# Patient Record
Sex: FEMALE | Race: BLACK OR AFRICAN AMERICAN | Employment: OTHER | URBAN - METROPOLITAN AREA
[De-identification: names, ages, dates, MRNs, and addresses within clinical notes are randomized per-mention and may not be internally consistent; named-entity substitution may affect disease eponyms.]

---

## 2017-02-24 LAB
CREATININE, EXTERNAL: 1.1
HBA1C MFR BLD HPLC: NORMAL %
LDL-C, EXTERNAL: 83

## 2017-03-08 ENCOUNTER — DOCUMENTATION ONLY (OUTPATIENT)
Dept: ENDOCRINOLOGY | Age: 77
End: 2017-03-08

## 2017-03-08 LAB — TSH SERPL DL<=0.005 MIU/L-ACNC: 9 M[IU]/L

## 2017-03-08 NOTE — PROGRESS NOTES
I called Teresa Hampton and relayed Dr. Clark Lynn message. She has 10 mg tabs now so she will cut them in half. I also asked her to make a appointment to come in and she said she couldn't do that now because she had to find a ride first and then she will call to make the appointment.     Yahir Mahmood

## 2017-03-08 NOTE — PROGRESS NOTES
I received outside labs. I have recorded them in the chart as external labs    TSH is 9.0  With FT4 of 1.0 and TT3 of 0.9  Both on low side  Possibly developing hypothyrodism  Does not appear to by on thyroid  Hormone replacement    Patient not seen this year yet,   No upcoming appointment noted in chart. Please advise patient to schedule appointment for f/u of her thyroid function. Also Advise her to reduce her methimazole to 5mg tablets. If she has 10mg tabs, she may need to cut those in half and take half. She should first check to see what dose each tablet is. Thanks,  Johnny Parrish.  [facility]  [address] [place] [address]

## 2017-09-01 LAB
CREATININE, EXTERNAL: NORMAL
LDL-C, EXTERNAL: 98

## 2017-09-18 ENCOUNTER — DOCUMENTATION ONLY (OUTPATIENT)
Dept: ENDOCRINOLOGY | Age: 77
End: 2017-09-18

## 2017-11-16 ENCOUNTER — OFFICE VISIT (OUTPATIENT)
Dept: ENDOCRINOLOGY | Age: 77
End: 2017-11-16

## 2017-11-16 VITALS
HEIGHT: 62 IN | BODY MASS INDEX: 36.64 KG/M2 | SYSTOLIC BLOOD PRESSURE: 122 MMHG | DIASTOLIC BLOOD PRESSURE: 78 MMHG | HEART RATE: 71 BPM | WEIGHT: 199.1 LBS

## 2017-11-16 DIAGNOSIS — E05.90 HYPERTHYROIDISM: Primary | ICD-10-CM

## 2017-11-16 NOTE — PROGRESS NOTES
CHIEF COMPLAINT:  F/u for Hyperthyroidism    HISTORY OF PRESENT ILLNESS:   Yoanna Troy is a 68 y.o. female with a PMHx as noted below who was referred to our endocrinology clinic for evaluation of Hyperthyroidism. THYROID HISTORY:  Patient described that she had been visiting her PCP and she was concerned that she was loosing 3-4 pounds each time she would visit him. She is not certain how long it has been going on. Patient has no personal history of prior thyroid disorders. Notably family history for thyroid disorders is positive in her sister who reportedly had \"eyes popping out\" and had a thyroidectomy, another sister was on medications. Both had hyperthyroidism. she denied recent illness and denies any recent neck tenderness. she denied occasional palpitations, denies heat intolerance, and denied tremors. Blood pressure and heart rate were normal.    INTERVAL HISTORY:   Patient has not been seen in the endocrine clinic in the past year.    She had been taking the methimazole regularly,   We had received outside labs back in march, TSH was 9, FT4 1, TT3 0.9,   I had recommended by phone that she reduce her dose to 5mg once daily,   She had continued that dose but we had not heard from her since that phone call,  Today she reports feeling well, though has some significant hairloss on the top of her head,  Denies palpitations or tremors, has some arthritis,    PAST MEDICAL/SURGICAL HISTORY:   Past Medical History:   Diagnosis Date    Asthma     when she gets a cold    CAD (coronary artery disease)     cardiac stents X2 2006    Diabetes ([facility] [address]     High cholesterol     Hypertension     Nausea & vomiting      Past Surgical History:   Procedure Laterality Date    CARDIAC SURG PROCEDURE UNLIST  2006    stents x 2    COLONOSCOPY N/A 8/3/2016    COLONOSCOPY performed by Madge Cranker., MD at \[facility]\"" ENDOSCOPY    COLONOSCOPY,DIAGNOSTIC  8/3/2016         COLONOSCOPY,SAIRA FISH,SNARE  8/3/2016  COLORECTAL SCRN; HI RISK IND  8/3/2016         HX HEMORRHOIDECTOMY      HX HYSTERECTOMY      HX TONSILLECTOMY      TN COLONOSCOPY FLX DX W/COLLJ SPEC WHEN PFRMD  10/20/2010            ALLERGIES:   Allergies   Allergen Reactions    Hydrochlorothiazide Cough    Codeine Nausea and Vomiting    Penicillins Other (comments)     \"went away from self\"       MEDICATIONS ON ADMISSION:     Current Outpatient Prescriptions:     methIMAzole (TAPAZOLE) 5 mg tablet, Take 1 Tab by mouth daily. No need to break in half, Disp: 90 Tab, Rfl: 2    meloxicam (MOBIC) 15 mg tablet, daily. , Disp: , Rfl:     valsartan-hydrochlorothiazide (DIOVAN-HCT) 160-12.5 mg per tablet, Take 1 Tab by mouth daily. , Disp: , Rfl:     glimepiride (AMARYL) 4 mg tablet, Take  by mouth every morning., Disp: , Rfl:     Omeprazole delayed release (PRILOSEC D/R) 20 mg tablet, Take 20 mg by mouth daily. , Disp: , Rfl:     gabapentin (NEURONTIN) 300 mg capsule, Take 300 mg by mouth every evening., Disp: , Rfl:     simvastatin (ZOCOR) 40 mg tablet, Take 40 mg by mouth nightly., Disp: , Rfl:     aspirin delayed-release (ASPIR-81) 81 mg tablet, Take 81 mg by mouth daily. , Disp: , Rfl:     metformin (GLUCOPHAGE) 500 mg tablet, Take 500 mg by mouth two (2) times daily (with meals). , Disp: , Rfl:     OMEGA-3 FATTY ACIDS (FISH OIL CONCENTRATE PO), Take 1 Tab by mouth three (3) times daily. , Disp: , Rfl:     ACETAMINOPHEN (TYLENOL ARTHRITIS PO), Take 1 Tab by mouth daily. , Disp: , Rfl:     SOCIAL HISTORY:   Social History     Social History    Marital status: UNKNOWN     Spouse name: N/A    Number of children: N/A    Years of education: N/A     Occupational History    Not on file.      Social History Main Topics    Smoking status: Never Smoker    Smokeless tobacco: Never Used    Alcohol use No    Drug use: No    Sexual activity: Not on file     Other Topics Concern    Not on file     Social History Narrative       FAMILY HISTORY:  Family History   Problem Relation Age of Onset    Heart Disease Mother        REVIEW OF SYSTEMS: Complete ROS assessed and noted for that which is described above, all else are negative. Eyes: normal  ENT: normal  CVS: normal  Resp: normal  GI: normal  : normal  GYN: normal  Endocrine: normal  Integument: normal  Musculoskeletal: arthritis  Neuro: normal  Psych: normal      PHYSICAL EXAMINATION:    VITAL SIGNS:  Visit Vitals    /78 (BP 1 Location: Left arm, BP Patient Position: Sitting)    Pulse 71    Ht 5' 2\" (1.575 m)    Wt 199 lb 1.6 oz (90.3 kg)    BMI 36.42 kg/m2       GENERAL: NCAT, Sitting comfortably, NAD  EYES: EOMI, non-icteric, no proptosis  Ear/Nose/Throat: NCAT, no inflammation, thyroid gland is smooth and not enlarged, nodularity is not felt on palpation. LYMPH NODES: No LAD  CARDIOVASCULAR: S1 S2, RRR, No murmur, 2+ radial pulses  RESPIRATORY: CTA b/l, no wheeze/rales  GASTROINTESTINAL: soft, NT, ND  MUSCULOSKELETAL: Normal ROM, no atrophy  SKIN: warm, no edema/rash/ or other skin changes  NEUROLOGIC: 5/5 power all extremities, AAOx3, no tremor   PSYCHIATRIC: Normal affect, Normal insight and judgement      REVIEW OF LABORATORY AND RADIOLOGY DATA:   Labs and documentation have been reviewed as described above. ASSESSMENT AND PLAN:   Yosef Langley is a 68 y.o. female with a PMHx as noted above who was referred to our endocrinology clinic for evaluation of hyperthyroidism. Hyperthyroidism (likely due to graves disease)    Based on her history and the notion of her sister having protruding eyes, we have a strong suspicion for graves disease. Uptake and scan was not able to be performed in the past (attempted) as the facilities near her home did not perform it. She did well on methimazole, and the need for tapering is reassuring, however she needs closer follow up during dynamic dose changing since that is the only way to safely make medication decisions.      Continue methimazole 5mg daily  Check TSH/FT4/TT3 today  Plan to f/u in 2 months,  F/u labs in clinic as she live a good distance from here,    Patient is advised to contact our office if they have any concerns before then. Brittany Ramires.  [facility]  [address] [place] [address]

## 2017-11-16 NOTE — MR AVS SNAPSHOT
Visit Information Date & Time Provider Department Dept. Phone Encounter #  
 11/16/2017  2:30 PM Serenity Guerra, [address] [facility] [phone] [id number] Follow-up Instructions Return in about 2 months (around 1/16/2018). Upcoming Health Maintenance Date Due DTaP/Tdap/Td series (1 - Tdap) 10/17/1961 ZOSTER VACCINE AGE 60> 8/17/2000 GLAUCOMA SCREENING Q2Y 10/17/2005 OSTEOPOROSIS SCREENING (DEXA) 10/17/2005 Pneumococcal 65+ Low/Medium Risk (1 of 2 - PCV13) 10/17/2005 MEDICARE YEARLY EXAM 10/17/2005 Influenza Age 5 to Adult 8/1/2017 Allergies as of 11/16/2017  Review Complete On: 11/16/2017 By: Serenity Guerra MD  
  
 Severity Noted Reaction Type Reactions Hydrochlorothiazide Medium 08/02/2016   Side Effect Cough Codeine  10/19/2010    Nausea and Vomiting Penicillins  10/19/2010    Other (comments) \"went away from self\" Current Immunizations  Never Reviewed No immunizations on file. Not reviewed this visit You Were Diagnosed With   
  
 Codes Comments Hyperthyroidism    -  Primary ICD-10-CM: E05.90 ICD-9-CM: 242.90 Vitals BP Pulse Height(growth percentile) Weight(growth percentile) BMI OB Status 122/78 (BP 1 Location: Left arm, BP Patient Position: Sitting) 71 5' 2\" (1.575 m) 199 lb 1.6 oz (90.3 kg) 36.42 kg/m2 Postmenopausal  
 Smoking Status Never Smoker BMI and BSA Data Body Mass Index Body Surface Area  
 36.42 kg/m 2 1.99 m 2 Preferred Pharmacy Pharmacy Name Phone [facility], [address] [place] [address] [address] Fredi Rivera [address] Your Updated Medication List  
  
   
This list is accurate as of: 11/16/17  2:48 PM.  Always use your most recent med list.  
  
  
  
  
 ASPIR-81 81 mg tablet Generic drug:  aspirin delayed-release Take 81 mg by mouth daily.   
  
 FISH OIL CONCENTRATE PO  
 Take 1 Tab by mouth three (3) times daily. gabapentin 300 mg capsule Commonly known as:  NEURONTIN Take 300 mg by mouth every evening. glimepiride 4 mg tablet Commonly known as:  AMARYL Take  by mouth every morning. meloxicam 15 mg tablet Commonly known as:  MOBIC  
daily. metFORMIN 500 mg tablet Commonly known as:  GLUCOPHAGE Take 500 mg by mouth two (2) times daily (with meals). methIMAzole 5 mg tablet Commonly known as:  TAPAZOLE Take 1 Tab by mouth daily. No need to break in half Omeprazole delayed release 20 mg tablet Commonly known as:  PRILOSEC D/R Take 20 mg by mouth daily. simvastatin 40 mg tablet Commonly known as:  ZOCOR Take 40 mg by mouth nightly. TYLENOL ARTHRITIS PO Take 1 Tab by mouth daily. valsartan-hydroCHLOROthiazide 160-12.5 mg per tablet Commonly known as:  DIOVAN-HCT Take 1 Tab by mouth daily. We Performed the Following   
 T3 TOTAL [id number] CPT(R)] T4, FREE [id number] CPT(R)] TSH 3RD GENERATION [92937 CPT(R)] Follow-up Instructions Return in about 2 months (around 1/16/2018). Introducing [facility]! [facility] introduces [employer] patient portal. Now you can access parts of your medical record, email your doctor's office, and request medication refills online. 1. In your internet browser, go to [email or web address] 2. Click on the First Time User? Click Here link in the Sign In box. You will see the New Member Sign Up page. 3. Enter your [employer] Access Code exactly as it appears below. You will not need to use this code after youve completed the sign-up process. If you do not sign up before the expiration date, you must request a new code. · [employer] Access Code: [email or web address] Expires: 2/14/2018  2:29 PM 
 
4.  Enter the last four digits of your Social Security Number (xxxx) and Date of Birth (mm/dd/yyyy) as indicated and click Submit. You will be taken to the next sign-up page. 5. Create a ShoutOut ID. This will be your ShoutOut login ID and cannot be changed, so think of one that is secure and easy to remember. 6. Create a ShoutOut password. You can change your password at any time. 7. Enter your Password Reset Question and Answer. This can be used at a later time if you forget your password. 8. Enter your e-mail address. You will receive e-mail notification when new information is available in [address] 9. Click Sign Up. You can now view and download portions of your medical record. 10. Click the Download Summary menu link to download a portable copy of your medical information. If you have questions, please visit the Frequently Asked Questions section of the ShoutOut website. Remember, ShoutOut is NOT to be used for urgent needs. For medical emergencies, dial 911. Now available from your iPhone and Android! Please provide this summary of care documentation to your next provider. Your primary care clinician is listed as Mort Leaver. If you have any questions after today's visit, please call [phone].

## 2017-11-17 ENCOUNTER — TELEPHONE (OUTPATIENT)
Dept: ENDOCRINOLOGY | Age: 77
End: 2017-11-17

## 2017-11-17 LAB
T3 SERPL-MCNC: 93 NG/DL (ref 71–180)
T4 FREE SERPL-MCNC: 1.21 NG/DL (ref 0.82–1.77)
TSH SERPL DL<=0.005 MIU/L-ACNC: 3.68 UIU/ML (ref 0.45–4.5)

## 2017-11-17 NOTE — PROGRESS NOTES
Patients thyroid function is perfect for her at this time,  Continue 5 mg of methimazole each day,  She should let us know if she needs any refills in advance,    Shawna Rowan.  39 [facility]  [address] [place] [address]

## 2017-11-17 NOTE — TELEPHONE ENCOUNTER
----- Message from Jeannine Sands MD sent at 11/17/2017  9:04 AM EST -----  Patients thyroid function is perfect for her at this time,  Continue 5 mg of methimazole each day,  She should let us know if she needs any refills in advance,    April Lee. [facility]  [address] [place] [address] Tony    ===================================================    Addendum: 11/17/2017, 2:32 PM    Spoke with Juanito Bowman by phone, and relayed the above message. She understood the information and will do as instructed.        Albertina Holbrook

## 2018-03-26 ENCOUNTER — OFFICE VISIT (OUTPATIENT)
Dept: ENDOCRINOLOGY | Age: 78
End: 2018-03-26

## 2018-03-26 VITALS
HEART RATE: 70 BPM | BODY MASS INDEX: 36.97 KG/M2 | WEIGHT: 200.9 LBS | DIASTOLIC BLOOD PRESSURE: 75 MMHG | SYSTOLIC BLOOD PRESSURE: 119 MMHG | HEIGHT: 62 IN

## 2018-03-26 DIAGNOSIS — E05.90 HYPERTHYROIDISM: Primary | ICD-10-CM

## 2018-03-26 NOTE — PROGRESS NOTES
CHIEF COMPLAINT:  F/u for Hyperthyroidism    HISTORY OF PRESENT ILLNESS:   Alice Maravilla is a 68 y.o. female with a PMHx as noted below who was referred to our endocrinology clinic for evaluation of Hyperthyroidism. THYROID HISTORY:  Patient described that she had been visiting her PCP and she was concerned that she was loosing 3-4 pounds each time she would visit him. She is not certain how long it has been going on. Patient has no personal history of prior thyroid disorders. Notably family history for thyroid disorders is positive in her sister who reportedly had \"eyes popping out\" and had a thyroidectomy, another sister was on medications. Both had hyperthyroidism. she denied recent illness and denies any recent neck tenderness. she denied occasional palpitations, denies heat intolerance, and denied tremors. Blood pressure and heart rate were normal.    INTERVAL HISTORY:   Patient last seen in Nov 2017, prior to that had not seen her in 1 year.    She had been taking the methimazole regularly, 5mg once daily,  No recent labs for review,  Her HR and blood pressure are stable today,  Denies palpitations or tremors, has some arthritis,  Review of most recent thyroid function:  Lab Results   Component Value Date    TSH 3.680 11/16/2017    TSH 9.0 02/24/2017    TSH 1.290 11/03/2016    FT4 1.21 11/16/2017    FT4 1.24 11/03/2016    T3LT 93 11/16/2017    T3LT 113 11/03/2016    TSILT CANCELED 09/01/2016      Thyroid Lab Key:  TSILT = Thyroid stimulating antibodies  TMCLT = TPO antibodies  T3LT = Total T3 levels  555413 = Direct FT4  565352 = Free T3      PAST MEDICAL/SURGICAL HISTORY:   Past Medical History:   Diagnosis Date    Asthma     when she gets a cold    CAD (coronary artery disease)     cardiac stents X2 2006    Diabetes ([address]     High cholesterol     Hypertension     Nausea & vomiting      Past Surgical History:   Procedure Laterality Date    CARDIAC SURG PROCEDURE UNLIST  2006    stents x 2    COLONOSCOPY N/A 8/3/2016    COLONOSCOPY performed by Marilynne Ganser., MD at hospitals ENDOSCOPY    COLONOSCOPY,DIAGNOSTIC  8/3/2016         COLONOSCOPY,REMV LESN,SNARE  8/3/2016         COLORECTAL SCRN; HI RISK IND  8/3/2016         HX HEMORRHOIDECTOMY      HX HYSTERECTOMY      HX TONSILLECTOMY      MS COLONOSCOPY FLX DX W/COLLJ SPEC WHEN PFRMD  10/20/2010            ALLERGIES:   Allergies   Allergen Reactions    Hydrochlorothiazide Cough    Codeine Nausea and Vomiting    Penicillins Other (comments)     \"went away from self\"       MEDICATIONS ON ADMISSION:     Current Outpatient Prescriptions:     methIMAzole (TAPAZOLE) 5 mg tablet, Take 1 Tab by mouth daily. No need to break in half, Disp: 90 Tab, Rfl: 2    meloxicam (MOBIC) 15 mg tablet, daily. , Disp: , Rfl:     valsartan-hydrochlorothiazide (DIOVAN-HCT) 160-12.5 mg per tablet, Take 1 Tab by mouth daily. , Disp: , Rfl:     glimepiride (AMARYL) 4 mg tablet, Take  by mouth every morning., Disp: , Rfl:     Omeprazole delayed release (PRILOSEC D/R) 20 mg tablet, Take 20 mg by mouth daily. , Disp: , Rfl:     gabapentin (NEURONTIN) 300 mg capsule, Take 300 mg by mouth every evening., Disp: , Rfl:     simvastatin (ZOCOR) 40 mg tablet, Take 40 mg by mouth nightly., Disp: , Rfl:     aspirin delayed-release (ASPIR-81) 81 mg tablet, Take 81 mg by mouth daily. , Disp: , Rfl:     metformin (GLUCOPHAGE) 500 mg tablet, Take 500 mg by mouth two (2) times daily (with meals). , Disp: , Rfl:     ACETAMINOPHEN (TYLENOL ARTHRITIS PO), Take 1 Tab by mouth daily. , Disp: , Rfl:     OMEGA-3 FATTY ACIDS (FISH OIL CONCENTRATE PO), Take 1 Tab by mouth three (3) times daily. , Disp: , Rfl:     SOCIAL HISTORY:   Social History     Social History    Marital status: UNKNOWN     Spouse name: N/A    Number of children: N/A    Years of education: N/A     Occupational History    Not on file.      Social History Main Topics    Smoking status: Never Smoker    Smokeless tobacco: Never Used    Alcohol use No    Drug use: No    Sexual activity: Not on file     Other Topics Concern    Not on file     Social History Narrative       FAMILY HISTORY:  Family History   Problem Relation Age of Onset    Heart Disease Mother        REVIEW OF SYSTEMS: Complete ROS assessed and noted for that which is described above, all else are negative. Eyes: normal  ENT: normal  CVS: normal  Resp: normal  GI: normal  : normal  GYN: normal  Endocrine: normal  Integument: normal  Musculoskeletal: arthritis  Neuro: normal  Psych: normal      PHYSICAL EXAMINATION:    VITAL SIGNS:  Visit Vitals    /75    Pulse 70    Ht 5' 2\" (1.575 m)    Wt 200 lb 14.4 oz (91.1 kg)    BMI 36.75 kg/m2       GENERAL: NCAT, Sitting comfortably, NAD  EYES: EOMI, non-icteric, no proptosis  Ear/Nose/Throat: NCAT, no inflammation, thyroid gland is smooth and not enlarged, nodularity is not felt on palpation. LYMPH NODES: No LAD  CARDIOVASCULAR: S1 S2, RRR, No murmur, 2+ radial pulses  RESPIRATORY: CTA b/l, no wheeze/rales  GASTROINTESTINAL: soft, NT, ND  MUSCULOSKELETAL: Normal ROM, no atrophy  SKIN: warm, no edema/rash/ or other skin changes  NEUROLOGIC: 5/5 power all extremities, AAOx3, no tremor   PSYCHIATRIC: Normal affect, Normal insight and judgement      REVIEW OF LABORATORY AND RADIOLOGY DATA:   Labs and documentation have been reviewed as described above. ASSESSMENT AND PLAN:   Viraj Diaz is a 68 y.o. female with a PMHx as noted above who was referred to our endocrinology clinic for evaluation of hyperthyroidism. Hyperthyroidism (likely due to graves disease)    Patients course has been following a predictable pattern and we can hopefully plan to continue tapering her dose in the near future. The goal is to have the best chance a long term remission from her graves disease.      Continue methimazole 5mg daily  Check TSH/FT4/TT3 today, will call with result and plan,  Plan to f/u in 3 months,  F/u labs in clinic as she live a good distance from here,    Patient is advised to contact our office if they have any concerns before then. Meli Jarrett.  39 [facility]  [facility]

## 2018-03-26 NOTE — PATIENT INSTRUCTIONS
Continue with 5mg methimazole once daily,   Blood levels today, will call with results and plan,   Will see you back in 3 months,     Sendy BECKHAM  39 [facility]  [address] [place] [address]

## 2018-03-26 NOTE — MR AVS SNAPSHOT
[address] [facility] [address] [address] [phone] Patient: Geoff Us MRN: [id number] YIK:[id number] Visit Information Date & Time Provider Department Dept. Phone Encounter #  
 3/26/2018 12:10 PM Cha Warren, [address] [place] [address] Diabetes and Endocrinology [id number] Follow-up Instructions Return in about 3 months (around 6/26/2018). Upcoming Health Maintenance Date Due DTaP/Tdap/Td series (1 - Tdap) 10/17/1961 ZOSTER VACCINE AGE 60> 8/17/2000 GLAUCOMA SCREENING Q2Y 10/17/2005 Bone Densitometry (Dexa) Screening 10/17/2005 Pneumococcal 65+ Low/Medium Risk (1 of 2 - PCV13) 10/17/2005 Influenza Age 5 to Adult 8/1/2017 MEDICARE YEARLY EXAM 3/14/2018 Allergies as of 3/26/2018  Review Complete On: 3/26/2018 By: Cha Warren MD  
  
 Severity Noted Reaction Type Reactions Hydrochlorothiazide Medium 08/02/2016   Side Effect Cough Codeine  10/19/2010    Nausea and Vomiting Penicillins  10/19/2010    Other (comments) \"went away from self\" Current Immunizations  Never Reviewed No immunizations on file. Not reviewed this visit You Were Diagnosed With   
  
 Codes Comments Hyperthyroidism    -  Primary ICD-10-CM: E05.90 ICD-9-CM: 242.90 Vitals BP Pulse Height(growth percentile) Weight(growth percentile) BMI OB Status 119/75 70 5' 2\" (1.575 m) 200 lb 14.4 oz (91.1 kg) 36.75 kg/m2 Postmenopausal  
 Smoking Status Never Smoker Vitals History BMI and BSA Data Body Mass Index Body Surface Area 36.75 kg/m 2 2 m 2 Preferred Pharmacy Pharmacy Name Phone 305 [facility], [address] [address] [place] [address] [address] Your Updated Medication List  
  
   
This list is accurate as of 3/26/18  1:00 PM.  Always use your most recent med list.  
  
  
  
  
 ASPIR-81 81 mg tablet Generic drug:  aspirin delayed-release Take 81 mg by mouth daily. FISH OIL CONCENTRATE PO Take 1 Tab by mouth three (3) times daily. gabapentin 300 mg capsule Commonly known as:  NEURONTIN Take 300 mg by mouth every evening. glimepiride 4 mg tablet Commonly known as:  AMARYL Take  by mouth every morning. meloxicam 15 mg tablet Commonly known as:  MOBIC  
daily. metFORMIN 500 mg tablet Commonly known as:  GLUCOPHAGE Take 500 mg by mouth two (2) times daily (with meals). methIMAzole 5 mg tablet Commonly known as:  TAPAZOLE Take 1 Tab by mouth daily. No need to break in half Omeprazole delayed release 20 mg tablet Commonly known as:  PRILOSEC D/R Take 20 mg by mouth daily. simvastatin 40 mg tablet Commonly known as:  ZOCOR Take 40 mg by mouth nightly. TYLENOL ARTHRITIS PO Take 1 Tab by mouth daily. valsartan-hydroCHLOROthiazide 160-12.5 mg per tablet Commonly known as:  DIOVAN-HCT Take 1 Tab by mouth daily. We Performed the Following   
 T3 TOTAL [id number] CPT(R)] T4, FREE [id number] CPT(R)] TSH 3RD GENERATION [85368 CPT(R)] Follow-up Instructions Return in about 3 months (around 6/26/2018). Patient Instructions Continue with 5mg methimazole once daily, Blood levels today, will call with results and plan, Will see you back in 3 months,  
 
Jazmine BECKHAM 0420 [facility] Diabetes & Endocrinology [address] [place] [address] & HEALTH SERVICES! [facility] introduces "[employer] patient portal. Now you can access parts of your medical record, email your doctor's office, and request medication refills online. 1. In your internet browser, go to [email or web address] 2. Click on the First Time User? Click Here link in the Sign In box. You will see the New Member Sign Up page. 3. Enter your "[employer] Access Code exactly as it appears below.  You will not need to use this code after youve completed the sign-up process. If you do not sign up before the expiration date, you must request a new code. · [employer] Access Code: [email or web address] Expires: 6/24/2018 12:59 PM 
 
4. Enter the last four digits of your Social Security Number (xxxx) and Date of Birth (mm/dd/yyyy) as indicated and click Submit. You will be taken to the next sign-up page. 5. Create a [employer] ID. This will be your [employer] login ID and cannot be changed, so think of one that is secure and easy to remember. 6. Create a [employer] password. You can change your password at any time. 7. Enter your Password Reset Question and Answer. This can be used at a later time if you forget your password. 8. Enter your e-mail address. You will receive e-mail notification when new information is available in [address] 9. Click Sign Up. You can now view and download portions of your medical record. 10. Click the Download Summary menu link to download a portable copy of your medical information. If you have questions, please visit the Frequently Asked Questions section of the [employer] website. Remember, [employer] is NOT to be used for urgent needs. For medical emergencies, dial 911. Now available from your iPhone and Android! Please provide this summary of care documentation to your next provider. Your primary care clinician is listed as Annamaria Amaya. If you have any questions after today's visit, please call [phone].

## 2018-03-27 LAB
T3 SERPL-MCNC: 107 NG/DL (ref 71–180)
T4 FREE SERPL-MCNC: 1.28 NG/DL (ref 0.82–1.77)
TSH SERPL DL<=0.005 MIU/L-ACNC: 3.36 UIU/ML (ref 0.45–4.5)

## 2018-03-27 NOTE — PROGRESS NOTES
Doing great on 5mg methimazole once daily,  No changes, continue, will see her back in 3 months,  Lives far from here, doesn't do prelabs, will check in clinic,    Thanks,  Albin Ann.  39 [facility] Endocrinology  [address] [place] [address]

## 2018-03-28 ENCOUNTER — TELEPHONE (OUTPATIENT)
Dept: ENDOCRINOLOGY | Age: 78
End: 2018-03-28

## 2018-03-28 NOTE — TELEPHONE ENCOUNTER
I called Ms. Arisderic Albert and relayed the message from Dr. Vignesh Sandhu. She understood the information and will do as instructed.   Sabine Kumar

## 2018-03-28 NOTE — TELEPHONE ENCOUNTER
----- Message from Mannie Mendiola MD sent at 3/27/2018 10:31 AM EDT -----  Doing great on 5mg methimazole once daily,  No changes, continue, will see her back in 3 months,  Lives far from here, doesn't do prelabs, will check in clinic,    Thanks,  Ilda Barros.  39 [facility] Endocrinology  [address] [place] [address]

## 2018-05-23 RX ORDER — METHIMAZOLE 5 MG/1
TABLET ORAL
Qty: 90 TAB | Refills: 3 | Status: SHIPPED | OUTPATIENT
Start: 2018-05-23

## 2018-06-27 ENCOUNTER — OFFICE VISIT (OUTPATIENT)
Dept: ENDOCRINOLOGY | Age: 78
End: 2018-06-27

## 2018-06-27 VITALS
WEIGHT: 203.6 LBS | HEART RATE: 62 BPM | BODY MASS INDEX: 37.47 KG/M2 | SYSTOLIC BLOOD PRESSURE: 145 MMHG | DIASTOLIC BLOOD PRESSURE: 84 MMHG | HEIGHT: 62 IN

## 2018-06-27 DIAGNOSIS — E05.90 HYPERTHYROIDISM: Primary | ICD-10-CM

## 2018-06-27 NOTE — PATIENT INSTRUCTIONS
Continue 5mg methimazole once daily,   We will call you with your results and any changes to your medicine,     See you back in 3 months,     Do BECKHAM  39 [facility] Endocrinology  [address] [place] [address]

## 2018-06-27 NOTE — PROGRESS NOTES
CHIEF COMPLAINT:  F/u for Hyperthyroidism    HISTORY OF PRESENT ILLNESS:   Veto Corbett is a 68 y.o. female with a PMHx as noted below who was referred to our endocrinology clinic for evaluation of Hyperthyroidism. THYROID HISTORY:  Patient described that she had been visiting her PCP and she was concerned that she was loosing 3-4 pounds each time she would visit him. She is not certain how long it has been going on. Patient has no personal history of prior thyroid disorders. Notably family history for thyroid disorders is positive in her sister who reportedly had \"eyes popping out\" and had a thyroidectomy, another sister was on medications. Both had hyperthyroidism. she denied recent illness and denies any recent neck tenderness. she denied occasional palpitations, denies heat intolerance, and denied tremors. Blood pressure and heart rate were normal.    INTERVAL HISTORY:   Continues on 5mg methimazole once daily,  Tolerating her medications well,   Her HR is stable.    No large goiter or symptoms of dypsphagia  No palpitations or other symptoms of hyperthyroidism,  Review of most recent thyroid function:  Lab Results   Component Value Date    TSH 3.360 03/26/2018    TSH 3.680 11/16/2017    TSH 9.0 02/24/2017    FT4 1.28 03/26/2018    FT4 1.21 11/16/2017    FT4 1.24 11/03/2016    T3LT 107 03/26/2018    T3LT 93 11/16/2017    T3LT 113 11/03/2016    TSILT CANCELED 09/01/2016      Thyroid Lab Key:  TSILT = Thyroid stimulating antibodies  TMCLT = TPO antibodies  T3LT = Total T3 levels  517091 = Direct FT4  [id number] = Free T3      PAST MEDICAL/SURGICAL HISTORY:   Past Medical History:   Diagnosis Date    Asthma     when she gets a cold    CAD (coronary artery disease)     cardiac stents X2 2006    Diabetes ([address]     High cholesterol     Hypertension     Nausea & vomiting      Past Surgical History:   Procedure Laterality Date    CARDIAC SURG PROCEDURE UNLIST  2006    stents x 2    COLONOSCOPY N/A 8/3/2016 COL[facility]COPY performed [facility] Von [facility] ENDOSCOPY    COLONOSCOPY,DIAGNOSTIC  8/3/2016         COLONOSCOPY,REMV LESN,SNARE  8/3/2016         COLORECTAL SCRN; HI RISK IND  8/3/2016         HX HEMORRHOIDECTOMY      HX HYSTERECTOMY      HX TONSILLECTOMY      UT COLONOSCOPY FLX DX W/COLLJ SPEC WHEN PFRMD  10/20/2010            ALLERGIES:   Allergies   Allergen Reactions    Hydrochlorothiazide Cough    Codeine Nausea and Vomiting    Penicillins Other (comments)     \"went away from self\"       MEDICATIONS ON ADMISSION:     Current Outpatient Prescriptions:     methIMAzole (TAPAZOLE) 5 mg tablet, TAKE 1 TABLET BY MOUTH  DAILY, Disp: 90 Tab, Rfl: 3    meloxicam (MOBIC) 15 mg tablet, daily. , Disp: , Rfl:     valsartan-hydrochlorothiazide (DIOVAN-HCT) 160-12.5 mg per tablet, Take 1 Tab by mouth daily. , Disp: , Rfl:     glimepiride (AMARYL) 4 mg tablet, Take  by mouth every morning., Disp: , Rfl:     Omeprazole delayed release (PRILOSEC D/R) 20 mg tablet, Take 20 mg by mouth daily. , Disp: , Rfl:     gabapentin (NEURONTIN) 300 mg capsule, Take 300 mg by mouth every evening., Disp: , Rfl:     simvastatin (ZOCOR) 40 mg tablet, Take 40 mg by mouth nightly., Disp: , Rfl:     aspirin delayed-release (ASPIR-81) 81 mg tablet, Take 81 mg by mouth daily. , Disp: , Rfl:     metformin (GLUCOPHAGE) 500 mg tablet, Take 500 mg by mouth two (2) times daily (with meals). , Disp: , Rfl:     OMEGA-3 FATTY ACIDS (FISH OIL CONCENTRATE PO), Take 1 Tab by mouth three (3) times daily. , Disp: , Rfl:     ACETAMINOPHEN (TYLENOL ARTHRITIS PO), Take 1 Tab by mouth daily. , Disp: , Rfl:     SOCIAL HISTORY:   Social History     Social History    Marital status: UNKNOWN     Spouse name: N/A    Number of children: N/A    Years of education: N/A     Occupational History    Not on file.      Social History Main Topics    Smoking status: Never Smoker    Smokeless tobacco: Never Used    Alcohol use No    Drug use: No    Sexual activity: Not on file     Other Topics Concern    Not on file     Social History Narrative       FAMILY HISTORY:  Family History   Problem Relation Age of Onset    Heart Disease Mother        REVIEW OF SYSTEMS: Complete ROS assessed and noted for that which is described above, all else are negative. Eyes: normal  ENT: normal  CVS: normal  Resp: normal  GI: normal  : normal  GYN: normal  Endocrine: normal  Integument: normal  Musculoskeletal: arthritis  Neuro: normal  Psych: normal      PHYSICAL EXAMINATION:    VITAL SIGNS:  Visit Vitals    /84 (BP 1 Location: Left arm, BP Patient Position: Sitting)    Pulse 62    Ht 5' 2\" (1.575 m)    Wt 203 lb 9.6 oz (92.4 kg)    BMI 37.24 kg/m2       GENERAL: NCAT, Sitting comfortably, NAD  EYES: EOMI, non-icteric, no proptosis  Ear/Nose/Throat: NCAT, no inflammation, thyroid gland is smooth and not enlarged, nodularity is not felt on palpation. LYMPH NODES: No LAD  CARDIOVASCULAR: S1 S2, RRR, No murmur, 2+ radial pulses  RESPIRATORY: CTA b/l, no wheeze/rales  GASTROINTESTINAL: soft, NT, ND  MUSCULOSKELETAL: Normal ROM, no atrophy  SKIN: warm, no edema/rash/ or other skin changes  NEUROLOGIC: 5/5 power all extremities, AAOx3, no tremor   PSYCHIATRIC: Normal affect, Normal insight and judgement      REVIEW OF LABORATORY AND RADIOLOGY DATA:   Labs and documentation have been reviewed as described above. ASSESSMENT AND PLAN:   Sean Marques is a 68 y.o. female with a PMHx as noted above who was referred to our endocrinology clinic for evaluation of hyperthyroidism. Hyperthyroidism (likely due to graves disease)    Patient is feeling well and tolerating her medications. We will need to assess her thyroid levels today.      Continue methimazole 5mg daily  Check TSH/FT4/TT3 today, will call with result and plan,  Plan to f/u in 3 months,  F/u labs in clinic as she live a good distance from here,    Patient is advised to contact our office if they have any concerns before then. Drenda Habermann.  39 [facility] Endocrinology   [facility]

## 2018-06-27 NOTE — MR AVS SNAPSHOT
[address] [place] [address] [address] [facility] [address] [address] [phone] Patient: Shaan lGynn MRN: [id number]:[id number]/[id number] Visit Information Date & Time Provider Department Dept. Phone Encounter #  
 6/27/2018 10:50 AM Gi Dudley, [address] [place] [address] Diabetes and Endocrinology [phone] Follow-up Instructions Return in about 3 months (around 9/27/2018). Upcoming Health Maintenance Date Due DTaP/Tdap/Td series (1 - Tdap) 10/17/1961 ZOSTER VACCINE AGE 60> 8/17/2000 GLAUCOMA SCREENING Q2Y 10/17/2005 Bone Densitometry (Dexa) Screening 10/17/2005 Pneumococcal 65+ Low/Medium Risk (1 of 2 - PCV13) 10/17/2005 MEDICARE YEARLY EXAM 3/14/2018 Influenza Age 5 to Adult 8/1/2018 Allergies as of 6/27/2018  Review Complete On: 6/27/2018 By: Gi Dudley MD  
  
 Severity Noted Reaction Type Reactions Hydrochlorothiazide Medium 08/02/2016   Side Effect Cough Codeine  10/19/2010    Nausea and Vomiting Penicillins  10/19/2010    Other (comments) \"went away from self\" Current Immunizations  Never Reviewed No immunizations on file. Not reviewed this visit You Were Diagnosed With   
  
 Codes Comments Hyperthyroidism    -  Primary ICD-10-CM: E05.90 ICD-9-CM: 242.90 Vitals BP Pulse Height(growth percentile) Weight(growth percentile) BMI OB Status 145/84 (BP 1 Location: Left arm, BP Patient Position: Sitting) 62 5' 2\" (1.575 m) 203 lb 9.6 oz (92.4 kg) 37.24 kg/m2 Postmenopausal  
 Smoking Status Never Smoker BMI and BSA Data Body Mass Index Body Surface Area  
 37.24 kg/m 2 2.01 m 2 Preferred Pharmacy Pharmacy Name Phone 305 [facility], [address] [address] [place] [address] [address] Your Updated Medication List  
  
   
This list is accurate as of 6/27/18 11:10 AM.  Always use your most recent med list.  
  
  
  
  
 ASPIR-81 81 mg tablet Generic drug:  aspirin delayed-release Take 81 mg by mouth daily. FISH OIL CONCENTRATE PO Take 1 Tab by mouth three (3) times daily. gabapentin 300 mg capsule Commonly known as:  NEURONTIN Take 300 mg by mouth every evening. glimepiride 4 mg tablet Commonly known as:  AMARYL Take  by mouth every morning. meloxicam 15 mg tablet Commonly known as:  MOBIC  
daily. metFORMIN 500 mg tablet Commonly known as:  GLUCOPHAGE Take 500 mg by mouth two (2) times daily (with meals). methIMAzole 5 mg tablet Commonly known as:  TAPAZOLE  
TAKE 1 TABLET BY MOUTH  DAILY Omeprazole delayed release 20 mg tablet Commonly known as:  PRILOSEC D/R Take 20 mg by mouth daily. simvastatin 40 mg tablet Commonly known as:  ZOCOR Take 40 mg by mouth nightly. TYLENOL ARTHRITIS PO Take 1 Tab by mouth daily. valsartan-hydroCHLOROthiazide 160-12.5 mg per tablet Commonly known as:  DIOVAN-HCT Take 1 Tab by mouth daily. We Performed the Following T4, FREE [id number] CPT(R)] TSH 3RD GENERATION [03424 CPT(R)] Follow-up Instructions Return in about 3 months (around 9/27/2018). Patient Instructions Continue 5mg methimazole once daily, We will call you with your results and any changes to your medicine, See you back in 3 months,  
 
Kvng VILLANUEVA. 4140 [facility] Diabetes & Endocrinology [address] [place] [address] & HEALTH SERVICES! [facility] introduces [employer] patient portal. Now you can access parts of your medical record, email your doctor's office, and request medication refills online. 1. In your internet browser, go to [email or web address] 2. Click on the First Time User? Click Here link in the Sign In box. You will see the New Member Sign Up page. 3. Enter your [employer] Access Code exactly as it appears below.  You will not need to use this code after youve completed the sign-up process. If you do not sign up before the expiration date, you must request a new code. · [employer] Access Code: [email or web address] Expires: 9/25/2018 11:09 AM 
 
4. Enter the last four digits of your Social Security Number (xxxx) and Date of Birth (mm/dd/yyyy) as indicated and click Submit. You will be taken to the next sign-up page. 5. Create a [employer] ID. This will be your [employer] login ID and cannot be changed, so think of one that is secure and easy to remember. 6. Create a [employer] password. You can change your password at any time. 7. Enter your Password Reset Question and Answer. This can be used at a later time if you forget your password. 8. Enter your e-mail address. You will receive e-mail notification when new information is available in [address] 9. Click Sign Up. You can now view and download portions of your medical record. 10. Click the Download Summary menu link to download a portable copy of your medical information. If you have questions, please visit the Frequently Asked Questions section of the [employer] website. Remember, [employer] is NOT to be used for urgent needs. For medical emergencies, dial 911. Now available from your iPhone and Android! Please provide this summary of care documentation to your next provider. Your primary care clinician is listed as Oscar Pearson. If you have any questions after today's visit, please call [phone].

## 2018-06-28 ENCOUNTER — TELEPHONE (OUTPATIENT)
Dept: ENDOCRINOLOGY | Age: 78
End: 2018-06-28

## 2018-06-28 LAB
T4 FREE SERPL-MCNC: 1.37 NG/DL (ref 0.82–1.77)
TSH SERPL DL<=0.005 MIU/L-ACNC: 3.59 UIU/ML (ref 0.45–4.5)

## 2018-06-28 NOTE — PROGRESS NOTES
Thyroid function is stable,   Continue 5mg methimazole each morning,     Vance Fan T.  39 [facility] Endocrinology  [address] [place] [address]

## 2018-06-28 NOTE — TELEPHONE ENCOUNTER
I attempted to call Ms. Aleks Khalil. I reached her voice mail. I let her a message relaying the message from Dr. Alex Jack and said to give me a call back with any questions.   Doreen Vaughn

## 2018-06-28 NOTE — TELEPHONE ENCOUNTER
----- Message from Florentin Barber MD sent at 6/28/2018  6:14 AM EDT -----  Thyroid function is stable,   Continue 5mg methimazole each morning,     Vance BECKHAM  39 [facility] Endocrinology  [address] [place] [address]

## 2018-12-05 ENCOUNTER — OFFICE VISIT (OUTPATIENT)
Dept: ENDOCRINOLOGY | Age: 78
End: 2018-12-05

## 2018-12-05 VITALS
DIASTOLIC BLOOD PRESSURE: 74 MMHG | HEART RATE: 85 BPM | BODY MASS INDEX: 37.6 KG/M2 | WEIGHT: 204.3 LBS | HEIGHT: 62 IN | SYSTOLIC BLOOD PRESSURE: 116 MMHG

## 2018-12-05 DIAGNOSIS — E05.90 HYPERTHYROIDISM: Primary | ICD-10-CM

## 2018-12-05 RX ORDER — IRBESARTAN AND HYDROCHLOROTHIAZIDE 150; 12.5 MG/1; MG/1
TABLET, FILM COATED ORAL
COMMUNITY
Start: 2018-10-14

## 2018-12-05 NOTE — PATIENT INSTRUCTIONS
Continue 5mg methimazole once daily,   We will call you with your results and any changes to your medicine,     See you back in 4 months,     Chayo VILLANUEVA.  [address] [place] [address] Endocrinology  [employer]

## 2018-12-05 NOTE — PROGRESS NOTES
CHIEF COMPLAINT:  F/u for Hyperthyroidism    HISTORY OF PRESENT ILLNESS:   Abraham Crocker is a 66 y.o. female with a PMHx as noted below who was referred to our endocrinology clinic for evaluation of Hyperthyroidism. THYROID HISTORY:  Patient described that she had been visiting her PCP and she was concerned that she was loosing 3-4 pounds each time she would visit him. She is not certain how long it has been going on. Patient has no personal history of prior thyroid disorders. Notably family history for thyroid disorders is positive in her sister who reportedly had \"eyes popping out\" and had a thyroidectomy, another sister was on medications. Both had hyperthyroidism. she denied recent illness and denies any recent neck tenderness. she denied occasional palpitations, denies heat intolerance, and denied tremors.    Blood pressure and heart rate were normal.    INTERVAL HISTORY:   Patient continues on 5mg methimazole once daily,  She has historically tolerated the medicine well,  Vitals reviewed and are stable,  No palpitations or other symptoms of hyperthyroidism,  No recent labs as she is unable to complete prelabs, will check today  Review of most recent thyroid function:  Lab Results   Component Value Date    TSH 3.590 06/27/2018    TSH 3.360 03/26/2018    TSH 3.680 11/16/2017    FT4 1.37 06/27/2018    FT4 1.28 03/26/2018    FT4 1.21 11/16/2017    T3LT 107 03/26/2018    T3LT 93 11/16/2017    T3LT 113 11/03/2016    TSILT CANCELED 09/01/2016      Thyroid Lab Key:  TSILT = Thyroid stimulating antibodies  TMCLT = TPO antibodies  T3LT = Total T3 levels  [id number] = Direct FT4  [id number] = Free T3      PAST MEDICAL/SURGICAL HISTORY:   Past Medical History:   Diagnosis Date    Asthma     when she gets a cold    CAD (coronary artery disease)     cardiac stents X2 2006    Diabetes ([address]     High cholesterol     Hypertension     Nausea & vomiting      Past Surgical History:   Procedure Laterality Date    CARDIAC SURG PROCEDURE UNLIST  2006    stents x 2    COLONOSCOPY N/A 8/3/2016    [facility] at [facility]OSCOPY    COLONOSCOPY,DIAGNOSTIC  8/3/2016         COLONOSCOPY,REMV LESN,SNARE  8/3/2016         COLORECTAL SCRN; HI RISK IND  8/3/2016         HX HEMORRHOIDECTOMY      HX HYSTERECTOMY      HX TONSILLECTOMY      DE COLONOSCOPY FLX DX W/COLLJ SPEC WHEN PFRMD  10/20/2010            ALLERGIES:   Allergies   Allergen Reactions    Hydrochlorothiazide Cough    Codeine Nausea and Vomiting    Penicillins Other (comments)     \"went away from self\"       MEDICATIONS ON ADMISSION:     Current Outpatient Medications:     irbesartan-hydroCHLOROthiazide (AVALIDE) 150-12.5 mg per tablet, , Disp: , Rfl:     methIMAzole (TAPAZOLE) 5 mg tablet, TAKE 1 TABLET BY MOUTH  DAILY, Disp: 90 Tab, Rfl: 3    meloxicam (MOBIC) 15 mg tablet, daily. , Disp: , Rfl:     glimepiride (AMARYL) 4 mg tablet, Take  by mouth every morning., Disp: , Rfl:     Omeprazole delayed release (PRILOSEC D/R) 20 mg tablet, Take 20 mg by mouth daily. , Disp: , Rfl:     gabapentin (NEURONTIN) 300 mg capsule, Take 300 mg by mouth every evening., Disp: , Rfl:     simvastatin (ZOCOR) 40 mg tablet, Take 40 mg by mouth nightly., Disp: , Rfl:     aspirin delayed-release (ASPIR-81) 81 mg tablet, Take 81 mg by mouth daily. , Disp: , Rfl:     metformin (GLUCOPHAGE) 500 mg tablet, Take 500 mg by mouth two (2) times daily (with meals). , Disp: , Rfl:     OMEGA-3 FATTY ACIDS (FISH OIL CONCENTRATE PO), Take 1 Tab by mouth three (3) times daily. , Disp: , Rfl:     valsartan-hydrochlorothiazide (DIOVAN-HCT) 160-12.5 mg per tablet, Take 1 Tab by mouth daily. , Disp: , Rfl:     ACETAMINOPHEN (TYLENOL ARTHRITIS PO), Take 1 Tab by mouth daily. , Disp: , Rfl:     SOCIAL HISTORY:   Social History     Socioeconomic History    Marital status: UNKNOWN     Spouse name: Not on file    Number of children: Not on file    Years of education: Not on file    Highest education level: Not on file   Social Needs    Financial resource strain: Not on file    Food insecurity - worry: Not on file    Food insecurity - inability: Not on file    Transportation needs - medical: Not on file   [place] needs - non-medical: Not on file   Occupational History    Not on file   Tobacco Use    Smoking status: Never Smoker    Smokeless tobacco: Never Used   Substance and Sexual Activity    Alcohol use: No    Drug use: No    Sexual activity: Not on file   Other Topics Concern    Not on file   Social History Narrative    Not on file       FAMILY HISTORY:  Family History   Problem Relation Age of Onset    Heart Disease Mother        REVIEW OF SYSTEMS: Complete ROS assessed and noted for that which is described above, all else are negative. Eyes: normal  ENT: normal  CVS: normal  Resp: normal  GI: normal  : normal  GYN: normal  Endocrine: normal  Integument: normal  Musculoskeletal: arthritis  Neuro: normal  Psych: normal      PHYSICAL EXAMINATION:    VITAL SIGNS:  Visit Vitals  /74 (BP 1 Location: Left arm, BP Patient Position: Sitting)   Pulse 85   Ht 5' 2\" (1.575 m)   Wt 204 lb 4.8 oz (92.7 kg)   BMI 37.37 kg/m²       GENERAL: NCAT, Sitting comfortably, NAD  EYES: EOMI, non-icteric, no proptosis  Ear/Nose/Throat: NCAT, no inflammation, thyroid gland is smooth and not enlarged, nodularity is not felt on palpation. LYMPH NODES: No LAD  CARDIOVASCULAR: S1 S2, RRR, No murmur, 2+ radial pulses  RESPIRATORY: CTA b/l, no wheeze/rales  GASTROINTESTINAL: soft, NT, ND  MUSCULOSKELETAL: Normal ROM, no atrophy  SKIN: warm, no edema/rash/ or other skin changes  NEUROLOGIC: 5/5 power all extremities, AAOx3, no tremor   PSYCHIATRIC: Normal affect, Normal insight and judgement      REVIEW OF LABORATORY AND RADIOLOGY DATA:   Labs and documentation have been reviewed as described above.      ASSESSMENT AND PLAN:   Ernie Alejo is a 66 y.o. female with a PMHx as noted above who was referred to our endocrinology clinic for evaluation of hyperthyroidism. Hyperthyroidism (likely due to graves disease)    Continue methimazole 5mg daily  Check TSH/FT4/TT3 today, will call with result and plan,  Plan to f/u in 4 months,  F/u labs in clinic as she live a good distance from here,    Patient is advised to contact our office if they have any concerns before then. Wlii Lyn.  39 [facility] Endocrinology  [address] [place] [address]

## 2018-12-06 LAB
T4 FREE SERPL-MCNC: 1.21 NG/DL (ref 0.82–1.77)
TSH SERPL DL<=0.005 MIU/L-ACNC: 4.43 UIU/ML (ref 0.45–4.5)

## 2018-12-06 NOTE — PROGRESS NOTES
Labs reviewed, TSh is 4.4,   FT4 is 1.21  Patient is 66years of age, so I would want to be careful of potential rebound hyperthyroidism. Plan for patient to continue a whole 5mg tablet for 2 months, then reduce to only 1/2 tablet once daily thereafter until next visit,   I called the patient and notified her, she demonstrated her understanding,    Beth Moyer.  39 [facility] Endocrinology  [address] [place] [address]

## 2019-04-04 ENCOUNTER — OFFICE VISIT (OUTPATIENT)
Dept: ENDOCRINOLOGY | Age: 79
End: 2019-04-04

## 2019-04-04 VITALS
WEIGHT: 216 LBS | SYSTOLIC BLOOD PRESSURE: 160 MMHG | BODY MASS INDEX: 39.75 KG/M2 | HEIGHT: 62 IN | DIASTOLIC BLOOD PRESSURE: 82 MMHG | HEART RATE: 67 BPM

## 2019-04-04 DIAGNOSIS — E05.90 HYPERTHYROIDISM: Primary | ICD-10-CM

## 2019-04-04 RX ORDER — FLUTICASONE PROPIONATE AND SALMETEROL 250; 50 UG/1; UG/1
POWDER RESPIRATORY (INHALATION)
COMMUNITY
Start: 2019-03-22

## 2019-04-04 RX ORDER — VALSARTAN 160 MG/1
TABLET ORAL
COMMUNITY
Start: 2019-02-12

## 2019-04-04 NOTE — PATIENT INSTRUCTIONS
Continue 2.5mg (1/2 tab) of methimazole each day,    Checking levels today,     Will call with result and plan,     Plan to return to clinic in 4 months,     Call with concerns,     Penelope Carrasquillo  39 [facility] Endocrinology  [address] [place] [address]

## 2019-04-04 NOTE — PROGRESS NOTES
CHIEF COMPLAINT:  F/u for Hyperthyroidism    HISTORY OF PRESENT ILLNESS:   Dena Maza is a 66 y.o. female with a PMHx as noted below who was referred to our endocrinology clinic for evaluation of Hyperthyroidism. THYROID HISTORY:  Patient described that she had been visiting her PCP and she was concerned that she was loosing 3-4 pounds each time she would visit him. She is not certain how long it has been going on. Patient has no personal history of prior thyroid disorders. Notably family history for thyroid disorders is positive in her sister who reportedly had \"eyes popping out\" and had a thyroidectomy, another sister was on medications. Both had hyperthyroidism. she denied recent illness and denies any recent neck tenderness. she denied occasional palpitations, denies heat intolerance, and denied tremors. Blood pressure and heart rate were normal.    INTERVAL HISTORY:   Patient last seen in December,  After reviewing labs collected on her visit, TSH of 4.4, provided the following instruction:   \"5mg tablet for 2 months, then reduce to only 1/2 tablet once daily thereafter until next visit\"  Patient has been taking 1/2 tab daily since that time.   She denies symptoms of hyper or hypothyroidism,  Reports feeling well today,  Patient lives far away, no lab access, labs collected in clinic, will check today,  Review of most recent thyroid function:  Lab Results   Component Value Date    TSH 4.430 12/05/2018    TSH 3.590 06/27/2018    TSH 3.360 03/26/2018    FT4 1.21 12/05/2018    FT4 1.37 06/27/2018    FT4 1.28 03/26/2018    T3LT 107 03/26/2018    T3LT 93 11/16/2017    T3LT 113 11/03/2016    TSILT CANCELED 09/01/2016      Thyroid Lab Key:  TSILT = Thyroid stimulating antibodies  TMCLT = TPO antibodies  T3LT = Total T3 levels  290695 = Direct FT4  399387 = Free T3      PAST MEDICAL/SURGICAL HISTORY:   Past Medical History:   Diagnosis Date    Asthma     when she gets a cold    CAD (coronary artery disease)     cardiac stents X2 2006    Diabetes [address] [address]     High cholesterol     Hypertension     Nausea & vomiting      Past Surgical History:   Procedure Laterality Date    CARDIAC SURG PROCEDURE UNLIST  2006    stents x 2    COLONOSCOPY N/A 8/3/2016    COL[facility] at [facility] ENDOSCOPY    COLONOSCOPY,DIAGNOSTIC  8/3/2016         COLONOSCOPY,REMV LESN,SNARE  8/3/2016         COLORECTAL SCRN; HI RISK IND  8/3/2016         HX HEMORRHOIDECTOMY      HX HYSTERECTOMY      HX TONSILLECTOMY      AK COLONOSCOPY FLX DX W/COLLJ SPEC WHEN PFRMD  10/20/2010            ALLERGIES:   Allergies   Allergen Reactions    Hydrochlorothiazide Cough    Codeine Nausea and Vomiting    Penicillins Other (comments)     \"went away from self\"       MEDICATIONS ON ADMISSION:     Current Outpatient Medications:     valsartan (DIOVAN) 160 mg tablet, , Disp: , Rfl:     WIXELA INHUB 250-50 mcg/dose diskus inhaler, , Disp: , Rfl:     methIMAzole (TAPAZOLE) 5 mg tablet, TAKE 1 TABLET BY MOUTH  DAILY (Patient taking differently: TAKE 1/2 TABLET BY MOUTH  DAILY), Disp: 90 Tab, Rfl: 3    meloxicam (MOBIC) 15 mg tablet, daily. , Disp: , Rfl:     glimepiride (AMARYL) 4 mg tablet, Take  by mouth every morning., Disp: , Rfl:     Omeprazole delayed release (PRILOSEC D/R) 20 mg tablet, Take 20 mg by mouth daily. , Disp: , Rfl:     gabapentin (NEURONTIN) 300 mg capsule, Take 300 mg by mouth every evening., Disp: , Rfl:     simvastatin (ZOCOR) 40 mg tablet, Take 40 mg by mouth nightly., Disp: , Rfl:     aspirin delayed-release (ASPIR-81) 81 mg tablet, Take 81 mg by mouth daily. , Disp: , Rfl:     metformin (GLUCOPHAGE) 500 mg tablet, Take 500 mg by mouth two (2) times daily (with meals). , Disp: , Rfl:     OMEGA-3 FATTY ACIDS (FISH OIL CONCENTRATE PO), Take 1 Tab by mouth three (3) times daily. , Disp: , Rfl:     irbesartan-hydroCHLOROthiazide (AVALIDE) 150-12.5 mg per tablet, , Disp: , Rfl:    valsartan-hydrochlorothiazide (DIOVAN-HCT) 160-12.5 mg per tablet, Take 1 Tab by mouth daily. , Disp: , Rfl:     ACETAMINOPHEN (TYLENOL ARTHRITIS PO), Take 1 Tab by mouth daily. , Disp: , Rfl:     SOCIAL HISTORY:   Social History     Socioeconomic History    Marital status: UNKNOWN     Spouse name: Not on file    Number of children: Not on file    Years of education: Not on file    Highest education level: Not on file   Occupational History    Not on file   Social Needs    Financial resource strain: Not on file    Food insecurity:     Worry: Not on file     Inability: Not on file    Transportation needs:     Medical: Not on file     Non-medical: Not on file   Tobacco Use    Smoking status: Never Smoker    Smokeless tobacco: Never Used   Substance and Sexual Activity    Alcohol use: No    Drug use: No    Sexual activity: Not on file   Lifestyle    Physical activity:     Days per week: Not on file     Minutes per session: Not on file    Stress: Not on file   Relationships    Social connections:     Talks on phone: Not on file     Gets together: Not on file     Attends Restorationist service: Not on file     Active member of club or organization: Not on file     Attends meetings of clubs or organizations: Not on file     Relationship status: Not on file    Intimate partner violence:     Fear of current or ex partner: Not on file     Emotionally abused: Not on file     Physically abused: Not on file     Forced sexual activity: Not on file   Other Topics Concern    Not on file   Social History Narrative    Not on file       FAMILY HISTORY:  Family History   Problem Relation Age of Onset    Heart Disease Mother        REVIEW OF SYSTEMS: Complete ROS assessed and noted for that which is described above, all else are negative.   Eyes: normal  ENT: normal  CVS: normal  Resp: normal  GI: normal  : normal  GYN: normal  Endocrine: normal  Integument: normal  Musculoskeletal: arthritis  Neuro: normal  Psych: normal      PHYSICAL EXAMINATION:    VITAL SIGNS:  Visit Vitals  /75 (BP 1 Location: Left arm, BP Patient Position: Sitting)   Pulse 75   Ht 5' 2\" (1.575 m)   Wt 216 lb (98 kg)   BMI 39.51 kg/m²       GENERAL: NCAT, Sitting comfortably, NAD  EYES: EOMI, non-icteric, no proptosis  Ear/Nose/Throat: NCAT, no inflammation, thyroid gland is smooth and not enlarged, nodularity is not felt on palpation. LYMPH NODES: No LAD  CARDIOVASCULAR: S1 S2, RRR, No murmur, 2+ radial pulses  RESPIRATORY: CTA b/l, no wheeze/rales  GASTROINTESTINAL: soft, NT, ND  MUSCULOSKELETAL: Normal ROM, no atrophy  SKIN: warm, no edema/rash/ or other skin changes  NEUROLOGIC: 5/5 power all extremities, AAOx3, no tremor   PSYCHIATRIC: Normal affect, Normal insight and judgement      REVIEW OF LABORATORY AND RADIOLOGY DATA:   Labs and documentation have been reviewed as described above. ASSESSMENT AND PLAN:   Qing Salas is a 66 y.o. female with a PMHx as noted above who was referred to our endocrinology clinic for evaluation of hyperthyroidism. Hyperthyroidism (likely due to graves disease)    Plant to continue with methimazole 2.5mg daily  Check TSH/FT4/TT3 today, will call with result and plan,  Plan to f/u in 4 months,  F/u labs in clinic as she live a good distance from here,    Patient is advised to contact our office if they have any concerns before then. Ronnie Aceves.  39 [facility] Endocrinology  [address] [place] [address]

## 2019-04-05 LAB
T3 SERPL-MCNC: 104 NG/DL (ref 71–180)
T4 FREE SERPL-MCNC: 1.34 NG/DL (ref 0.82–1.77)
TSH SERPL DL<=0.005 MIU/L-ACNC: 3.05 UIU/ML (ref 0.45–4.5)

## 2019-04-09 ENCOUNTER — TELEPHONE (OUTPATIENT)
Dept: ENDOCRINOLOGY | Age: 79
End: 2019-04-09

## 2019-04-09 NOTE — TELEPHONE ENCOUNTER
I called Ms. Hortencia Magdaleno and relayed the message from Dr. Meeta Nation. She understood the information and will do as instructed.   Luis Lorenz

## 2019-04-09 NOTE — TELEPHONE ENCOUNTER
----- Message from Salome Amaya MD sent at 4/9/2019  4:04 PM EDT -----  Baylee Muñoz, patients labs look pretty stable,   Continue 2.5mg (1/2 tab) of methimazole once daily,    Hollie Engle.  39 [facility] Endocrinology  [address] [place] [address]

## 2019-04-09 NOTE — PROGRESS NOTES
[facility], patients labs look pretty stable,   Continue 2.5mg (1/2 tab) of methimazole once daily,    Nancy Myles.  39 [facility] Endocrinology  [address] [place] [address]

## 2019-08-07 ENCOUNTER — HOSPITAL ENCOUNTER (OUTPATIENT)
Age: 79
Setting detail: OUTPATIENT SURGERY
End: 2019-08-07
Attending: INTERNAL MEDICINE | Admitting: INTERNAL MEDICINE

## 2019-11-14 ENCOUNTER — OFFICE VISIT (OUTPATIENT)
Dept: ENDOCRINOLOGY | Age: 79
End: 2019-11-14

## 2019-11-14 VITALS
BODY MASS INDEX: 36.62 KG/M2 | HEART RATE: 79 BPM | HEIGHT: 62 IN | DIASTOLIC BLOOD PRESSURE: 70 MMHG | SYSTOLIC BLOOD PRESSURE: 108 MMHG | WEIGHT: 199 LBS

## 2019-11-14 DIAGNOSIS — E05.90 HYPERTHYROIDISM: Primary | ICD-10-CM

## 2019-11-14 NOTE — PROGRESS NOTES
CHIEF COMPLAINT:  F/u for Hyperthyroidism    HISTORY OF PRESENT ILLNESS:   Jammie Hugo is a 78 y.o. female with a PMHx as noted below who was referred to our endocrinology clinic for evaluation of Hyperthyroidism. THYROID HISTORY:  Patient described that she had been visiting her PCP and she was concerned that she was loosing 3-4 pounds each time she would visit him. She is not certain how long it has been going on. Patient has no personal history of prior thyroid disorders. Notably family history for thyroid disorders is positive in her sister who reportedly had \"eyes popping out\" and had a thyroidectomy, another sister was on medications. Both had hyperthyroidism. she denied recent illness and denies any recent neck tenderness. she denied occasional palpitations, denies heat intolerance, and denied tremors.    Blood pressure and heart rate were normal.    INTERVAL HISTORY:   Patient last seen in April 2019,  We had continued her on 2.5mg once daily of methimazole at the time,  Her TSH was 3.05 with stable thyroid hormone levels back in April,  Patient has not been missing doses,  She denies symptoms of hyper or hypothyroidism,  Reports feeling ok today,  Patient lives far away, no lab access, labs collected in clinic, will check today,  Review of most recent thyroid function:  Lab Results   Component Value Date    TSH 3.050 04/04/2019    TSH 4.430 12/05/2018    TSH 3.590 06/27/2018    FT4 1.34 04/04/2019    FT4 1.21 12/05/2018    FT4 1.37 06/27/2018    T3LT 104 04/04/2019    T3LT 107 03/26/2018    T3LT 93 11/16/2017    TSILT CANCELED 09/01/2016      Thyroid Lab Key:  TSILT = Thyroid stimulating antibodies  TMCLT = TPO antibodies  T3LT = Total T3 levels  617392 = Direct FT4  998702 = Free T3      PAST MEDICAL/SURGICAL HISTORY:   Past Medical History:   Diagnosis Date    Asthma     when she gets a cold    CAD (coronary artery disease)     cardiac stents X2 2006    Diabetes ([facility] [address]     High cholesterol  Hypertension     Nausea & vomiting      Past Surgical History:   Procedure Laterality Date    CARDIAC SURG PROCEDURE UNLIST  2006    stents x 2    COLONOSCOPY N/A 8/3/2016    COLONOSCOPY performed by Colin Naranjo MD at [facility] ENDOSCOPY    COLONOSCOPY,DIAGNOSTIC  8/3/2016         COLONOSCOPY,SAIRA FISH,SNARE  8/3/2016         COLORECTAL SCRN; HI RISK IND  8/3/2016         HX HEMORRHOIDECTOMY      HX HYSTERECTOMY      HX TONSILLECTOMY      VT COLONOSCOPY FLX DX W/COLLJ SPEC WHEN PFRMD  10/20/2010            ALLERGIES:   Allergies   Allergen Reactions    Hydrochlorothiazide Cough    Codeine Nausea and Vomiting    Penicillins Other (comments)     \"went away from self\"       MEDICATIONS ON ADMISSION:     Current Outpatient Medications:     [place] 250-50 mcg/dose diskus inhaler, , Disp: , Rfl:     irbesartan-hydroCHLOROthiazide (AVALIDE) 150-12.5 mg per tablet, , Disp: , Rfl:     methIMAzole (TAPAZOLE) 5 mg tablet, TAKE 1 TABLET BY MOUTH  DAILY (Patient taking differently: TAKE 1/2 TABLET BY MOUTH  DAILY), Disp: 90 Tab, Rfl: 3    meloxicam (MOBIC) 15 mg tablet, daily. , Disp: , Rfl:     valsartan-hydrochlorothiazide (DIOVAN-HCT) 160-12.5 mg per tablet, Take 1 Tab by mouth daily. , Disp: , Rfl:     glimepiride (AMARYL) 4 mg tablet, Take  by mouth every morning., Disp: , Rfl:     Omeprazole delayed release (PRILOSEC D/R) 20 mg tablet, Take 20 mg by mouth daily. , Disp: , Rfl:     simvastatin (ZOCOR) 40 mg tablet, Take 40 mg by mouth nightly., Disp: , Rfl:     aspirin delayed-release (ASPIR-81) 81 mg tablet, Take 81 mg by mouth daily. , Disp: , Rfl:     metformin (GLUCOPHAGE) 500 mg tablet, Take 500 mg by mouth two (2) times daily (with meals). , Disp: , Rfl:     ACETAMINOPHEN (TYLENOL ARTHRITIS PO), Take 1 Tab by mouth daily. , Disp: , Rfl:     OMEGA-3 FATTY ACIDS (FISH OIL CONCENTRATE PO), Take 1 Tab by mouth three (3) times daily. , Disp: , Rfl:     valsartan (DIOVAN) 160 mg tablet, , Disp: , Rfl:     gabapentin (NEURONTIN) 300 mg capsule, Take 300 mg by mouth every evening., Disp: , Rfl:     SOCIAL HISTORY:   Social History     Socioeconomic History    Marital status: UNKNOWN     Spouse name: Not on file    Number of children: Not on file    Years of education: Not on file    Highest education level: Not on file   Occupational History    Not on file   Social Needs    Financial resource strain: Not on file    Food insecurity:     Worry: Not on file     Inability: Not on file    Transportation needs:     Medical: Not on file     Non-medical: Not on file   Tobacco Use    Smoking status: Never Smoker    Smokeless tobacco: Never Used   Substance and Sexual Activity    Alcohol use: No    Drug use: No    Sexual activity: Not on file   Lifestyle    Physical activity:     Days per week: Not on file     Minutes per session: Not on file    Stress: Not on file   Relationships    Social connections:     Talks on phone: Not on file     Gets together: Not on file     Attends Mormonism service: Not on file     Active member of club or organization: Not on file     Attends meetings of clubs or organizations: Not on file     Relationship status: Not on file    Intimate partner violence:     Fear of current or ex partner: Not on file     Emotionally abused: Not on file     Physically abused: Not on file     Forced sexual activity: Not on file   Other Topics Concern    Not on file   Social History Narrative    Not on file       FAMILY HISTORY:  Family History   Problem Relation Age of Onset    Heart Disease Mother        REVIEW OF SYSTEMS: Complete ROS assessed and noted for that which is described above, all else are negative.   Eyes: normal  ENT: normal  CVS: normal  Resp: normal  GI: normal  : normal  GYN: normal  Endocrine: normal  Integument: normal  Musculoskeletal: arthritis  Neuro: normal  Psych: normal      PHYSICAL EXAMINATION:    VITAL SIGNS:  Visit Vitals  /70 (BP 1 Location: Left arm, BP Patient Position: Sitting)   Pulse 79   Ht 5' 2\" (1.575 m)   Wt 199 lb (90.3 kg)   BMI 36.40 kg/m²       GENERAL: NCAT, Sitting comfortably, NAD  EYES: EOMI, non-icteric, no proptosis  Ear/Nose/Throat: NCAT, no inflammation, thyroid gland is smooth and not enlarged, nodularity is not felt on palpation. LYMPH NODES: No LAD  CARDIOVASCULAR: S1 S2, RRR, No murmur, 2+ radial pulses  RESPIRATORY: CTA b/l, no wheeze/rales  GASTROINTESTINAL: soft, NT, ND  MUSCULOSKELETAL: Normal ROM, no atrophy  SKIN: warm, no edema/rash/ or other skin changes  NEUROLOGIC: 5/5 power all extremities, AAOx3, no tremor   PSYCHIATRIC: Normal affect, Normal insight and judgement      REVIEW OF LABORATORY AND RADIOLOGY DATA:   Labs and documentation have been reviewed as described above. ASSESSMENT AND PLAN:   Remy Manriquez is a 78 y.o. female with a PMHx as noted above who was referred to our endocrinology clinic for evaluation of hyperthyroidism. Hyperthyroidism (likely due to graves disease)    Clinically stable, we will continue methimazole 2.5mg daily,  Check TSH/FT4/TT3 today, will call with result and plan,  Plan to f/u in 4 months,  F/u labs in clinic as she live a good distance from here,    Patient is advised to contact our office if they have any concerns before then. Vandana Burns.  39 [facility] Endocrinology  [address] [place] [address]

## 2019-11-14 NOTE — PATIENT INSTRUCTIONS
Continue 2.5mg (1/2 tab) of methimazole each day,    Checking levels today,     Will call with result and plan,     Plan to return to clinic in 4 months,     Call with concerns,     Shola Weinberg.  39 [facility] Endocrinology  [address] [place] [address]

## 2019-11-15 LAB
T3 SERPL-MCNC: 108 NG/DL (ref 71–180)
T4 FREE SERPL-MCNC: 1.39 NG/DL (ref 0.82–1.77)
TSH SERPL DL<=0.005 MIU/L-ACNC: 1.39 UIU/ML (ref 0.45–4.5)

## 2019-11-15 NOTE — PROGRESS NOTES
Thyroid levels look good,  Continue 1/2 tab (2.5mg) of methimazole each day,    Lisy BECKHAM  [address] Endocrinology  [facility]

## 2019-11-18 ENCOUNTER — TELEPHONE (OUTPATIENT)
Dept: ENDOCRINOLOGY | Age: 79
End: 2019-11-18

## 2019-11-18 NOTE — TELEPHONE ENCOUNTER
----- Message from Tamela Lauren sent at 11/18/2019  2:20 PM EST -----  Regarding: Dr. Isabella Gimenez  General Message/Vendor Calls    Caller's first and last name: Mariam Burch/pt      Reason for call: requesting a call back in regards to her labs results form November 14,2019      Callback required yes/no and why:      Best contact number(s): [phone]      Details to clarify the request:      Tamela Lauren

## 2019-11-19 NOTE — TELEPHONE ENCOUNTER
I had sent the message to our covering nurse previously but I suspect it was not relayed to the patient or maybe she was not able to reach patient but did not document it. Plan as noted: \"Thyroid levels look good,  Continue 1/2 tab (2.5mg) of methimazole each day,\"    Thanks, ! Radha Her.  [address] [facility]  [address] [place] [address]

## 2019-11-19 NOTE — TELEPHONE ENCOUNTER
I returned this call and relayed the message from Dr. Shilpa Singh. She understood this information and will do as instructed.   Deonna Suggs

## 2019-11-20 ENCOUNTER — TELEPHONE (OUTPATIENT)
Dept: ENDOCRINOLOGY | Age: 79
End: 2019-11-20

## 2021-07-08 ENCOUNTER — TRANSCRIBE ORDER (OUTPATIENT)
Dept: SCHEDULING | Age: 81
End: 2021-07-08

## 2021-07-08 DIAGNOSIS — Z78.0 POSTMENOPAUSAL: Primary | ICD-10-CM

## 2021-08-03 ENCOUNTER — HOSPITAL ENCOUNTER (OUTPATIENT)
Dept: GENERAL RADIOLOGY | Age: 81
Discharge: HOME | End: 2021-08-03
Attending: INTERNAL MEDICINE
Payer: MEDICARE

## 2021-08-03 DIAGNOSIS — Z78.0 POSTMENOPAUSAL: ICD-10-CM

## 2021-08-03 PROCEDURE — 77080 DXA BONE DENSITY AXIAL: CPT

## 2022-08-29 ENCOUNTER — TRANSCRIBE ORDER (OUTPATIENT)
Dept: REGISTRATION | Age: 82
End: 2022-08-29

## 2022-08-29 ENCOUNTER — HOSPITAL ENCOUNTER (OUTPATIENT)
Dept: GENERAL RADIOLOGY | Age: 82
Discharge: HOME | End: 2022-08-29
Payer: MEDICARE

## 2022-08-29 DIAGNOSIS — M25.551 RIGHT HIP PAIN: ICD-10-CM

## 2022-08-29 DIAGNOSIS — M25.551 RIGHT HIP PAIN: Primary | ICD-10-CM

## 2022-08-29 PROCEDURE — 73502 X-RAY EXAM HIP UNI 2-3 VIEWS: CPT

## 2022-09-01 ENCOUNTER — HOSPITAL ENCOUNTER (OUTPATIENT)
Dept: GENERAL RADIOLOGY | Age: 82
Discharge: HOME | End: 2022-09-01
Payer: MEDICARE

## 2022-09-01 ENCOUNTER — TRANSCRIBE ORDER (OUTPATIENT)
Dept: REGISTRATION | Age: 82
End: 2022-09-01

## 2022-09-01 DIAGNOSIS — M54.40 LUMBAGO WITH SCIATICA, UNSPECIFIED SIDE: Primary | ICD-10-CM

## 2022-09-01 DIAGNOSIS — M54.40 LUMBAGO WITH SCIATICA, UNSPECIFIED SIDE: ICD-10-CM

## 2022-09-01 PROCEDURE — 72110 X-RAY EXAM L-2 SPINE 4/>VWS: CPT

## 2022-09-12 ENCOUNTER — TRANSCRIBE ORDER (OUTPATIENT)
Dept: SCHEDULING | Age: 82
End: 2022-09-12

## 2022-09-12 DIAGNOSIS — M25.551 RIGHT HIP PAIN: Primary | ICD-10-CM

## 2022-09-13 ENCOUNTER — HOSPITAL ENCOUNTER (OUTPATIENT)
Dept: MRI IMAGING | Age: 82
Discharge: HOME | End: 2022-09-13
Attending: INTERNAL MEDICINE
Payer: MEDICARE

## 2022-09-13 DIAGNOSIS — M25.551 RIGHT HIP PAIN: ICD-10-CM

## 2022-09-13 PROCEDURE — 73721 MRI JNT OF LWR EXTRE W/O DYE: CPT

## 2023-08-21 ENCOUNTER — HOSPITAL ENCOUNTER (OUTPATIENT)
Facility: HOSPITAL | Age: 83
Discharge: HOME | End: 2023-08-24
Payer: MEDICARE

## 2023-08-21 DIAGNOSIS — Z78.0 ASYMPTOMATIC POSTMENOPAUSAL STATE: ICD-10-CM

## 2023-08-21 PROCEDURE — 77080 DXA BONE DENSITY AXIAL: CPT

## 2025-05-28 PROBLEM — D50.9 IRON DEFICIENCY ANEMIA, UNSPECIFIED: Status: ACTIVE | Noted: 2025-05-28

## 2025-05-28 RX ORDER — HEPARIN 100 UNIT/ML
500 SYRINGE INTRAVENOUS PRN
OUTPATIENT
Start: 2025-05-28

## 2025-05-28 RX ORDER — SODIUM CHLORIDE 9 MG/ML
5-250 INJECTION, SOLUTION INTRAVENOUS PRN
OUTPATIENT
Start: 2025-05-28

## 2025-05-29 ENCOUNTER — HOSPITAL ENCOUNTER (OUTPATIENT)
Facility: HOSPITAL | Age: 85
Setting detail: INFUSION SERIES
Discharge: HOME | End: 2025-05-29
Payer: MEDICARE

## 2025-05-29 VITALS
WEIGHT: 179 LBS | DIASTOLIC BLOOD PRESSURE: 76 MMHG | SYSTOLIC BLOOD PRESSURE: 158 MMHG | HEART RATE: 81 BPM | RESPIRATION RATE: 20 BRPM | TEMPERATURE: 98 F | OXYGEN SATURATION: 99 %

## 2025-05-29 DIAGNOSIS — D50.9 IRON DEFICIENCY ANEMIA, UNSPECIFIED IRON DEFICIENCY ANEMIA TYPE: Primary | ICD-10-CM

## 2025-05-29 PROCEDURE — 96374 THER/PROPH/DIAG INJ IV PUSH: CPT

## 2025-05-29 PROCEDURE — 6360000002 HC RX W HCPCS: Performed by: INTERNAL MEDICINE

## 2025-05-29 PROCEDURE — 2580000003 HC RX 258: Performed by: INTERNAL MEDICINE

## 2025-05-29 RX ORDER — ONDANSETRON 2 MG/ML
8 INJECTION INTRAMUSCULAR; INTRAVENOUS
OUTPATIENT
Start: 2025-06-05

## 2025-05-29 RX ORDER — ALBUTEROL SULFATE 90 UG/1
4 INHALANT RESPIRATORY (INHALATION) PRN
OUTPATIENT
Start: 2025-06-05

## 2025-05-29 RX ORDER — SODIUM CHLORIDE 9 MG/ML
5-250 INJECTION, SOLUTION INTRAVENOUS PRN
OUTPATIENT
Start: 2025-06-05

## 2025-05-29 RX ORDER — EPINEPHRINE 1 MG/ML
0.3 INJECTION, SOLUTION, CONCENTRATE INTRAVENOUS PRN
Status: DISCONTINUED | OUTPATIENT
Start: 2025-05-29 | End: 2025-05-30 | Stop reason: HOSPADM

## 2025-05-29 RX ORDER — ACETAMINOPHEN 325 MG/1
650 TABLET ORAL
OUTPATIENT
Start: 2025-06-05

## 2025-05-29 RX ORDER — SODIUM CHLORIDE 9 MG/ML
INJECTION, SOLUTION INTRAVENOUS CONTINUOUS
Status: DISCONTINUED | OUTPATIENT
Start: 2025-05-29 | End: 2025-05-30 | Stop reason: HOSPADM

## 2025-05-29 RX ORDER — EPINEPHRINE 1 MG/ML
0.3 INJECTION, SOLUTION, CONCENTRATE INTRAVENOUS PRN
OUTPATIENT
Start: 2025-06-05

## 2025-05-29 RX ORDER — ONDANSETRON 2 MG/ML
8 INJECTION INTRAMUSCULAR; INTRAVENOUS
Status: DISCONTINUED | OUTPATIENT
Start: 2025-05-29 | End: 2025-05-30 | Stop reason: HOSPADM

## 2025-05-29 RX ORDER — ALBUTEROL SULFATE 90 UG/1
4 INHALANT RESPIRATORY (INHALATION) PRN
Status: DISCONTINUED | OUTPATIENT
Start: 2025-05-29 | End: 2025-05-30 | Stop reason: HOSPADM

## 2025-05-29 RX ORDER — ACETAMINOPHEN 325 MG/1
650 TABLET ORAL
Status: DISCONTINUED | OUTPATIENT
Start: 2025-05-29 | End: 2025-05-30 | Stop reason: HOSPADM

## 2025-05-29 RX ORDER — HYDROCORTISONE SODIUM SUCCINATE 100 MG/2ML
100 INJECTION INTRAMUSCULAR; INTRAVENOUS
OUTPATIENT
Start: 2025-06-05

## 2025-05-29 RX ORDER — SODIUM CHLORIDE 9 MG/ML
INJECTION, SOLUTION INTRAVENOUS CONTINUOUS
OUTPATIENT
Start: 2025-06-05

## 2025-05-29 RX ORDER — SODIUM CHLORIDE 0.9 % (FLUSH) 0.9 %
5-40 SYRINGE (ML) INJECTION PRN
OUTPATIENT
Start: 2025-06-05

## 2025-05-29 RX ORDER — DIPHENHYDRAMINE HYDROCHLORIDE 50 MG/ML
50 INJECTION, SOLUTION INTRAMUSCULAR; INTRAVENOUS
OUTPATIENT
Start: 2025-06-05

## 2025-05-29 RX ORDER — HEPARIN 100 UNIT/ML
500 SYRINGE INTRAVENOUS PRN
OUTPATIENT
Start: 2025-06-05

## 2025-05-29 RX ORDER — DIPHENHYDRAMINE HYDROCHLORIDE 50 MG/ML
50 INJECTION, SOLUTION INTRAMUSCULAR; INTRAVENOUS
Status: DISCONTINUED | OUTPATIENT
Start: 2025-05-29 | End: 2025-05-30 | Stop reason: HOSPADM

## 2025-05-29 RX ORDER — SODIUM CHLORIDE 9 MG/ML
5-250 INJECTION, SOLUTION INTRAVENOUS PRN
Status: DISCONTINUED | OUTPATIENT
Start: 2025-05-29 | End: 2025-05-30 | Stop reason: HOSPADM

## 2025-05-29 RX ORDER — SODIUM CHLORIDE 0.9 % (FLUSH) 0.9 %
5-40 SYRINGE (ML) INJECTION PRN
Status: DISCONTINUED | OUTPATIENT
Start: 2025-05-29 | End: 2025-05-30 | Stop reason: HOSPADM

## 2025-05-29 RX ORDER — HYDROCORTISONE SODIUM SUCCINATE 100 MG/2ML
100 INJECTION INTRAMUSCULAR; INTRAVENOUS
Status: DISCONTINUED | OUTPATIENT
Start: 2025-05-29 | End: 2025-05-30 | Stop reason: HOSPADM

## 2025-05-29 RX ADMIN — SODIUM CHLORIDE 100 ML/HR: 0.9 INJECTION, SOLUTION INTRAVENOUS at 10:18

## 2025-05-29 RX ADMIN — FERRIC CARBOXYMALTOSE INJECTION 750 MG: 50 INJECTION, SOLUTION INTRAVENOUS at 10:19

## 2025-05-29 NOTE — DISCHARGE INSTRUCTIONS
ferric carboxymaltose  Pronunciation:  DANK ik abhishek BOX ee SHANAEL rosita  Brand:  Injectafer  What is the most important information I should know about ferric carboxymaltose?  Use only as directed. Tell your doctor if you use other medicines or have other medical conditions or allergies.  What is ferric carboxymaltose?  Ferric carboxymaltose is an iron replacement product that is used in adults used to treat iron deficiency anemia (MKIE), which is low red blood cells caused by a lack of iron in the body.  Ferric carboxymaltose is given to adults with MIKE and chronic kidney disease (not on dialysis), or to adults with MIKE when iron taken by mouth is not effective.  Ferric carboxymaltose may also be used for purposes not listed in this medication guide.  What should I discuss with my healthcare provider before receiving ferric carboxymaltose?  You should not use ferric carboxymaltose if you are allergic to it.  Tell your doctor if you have ever had:  high blood pressure; or  an allergic reaction to iron injected into a vein.  Tell your doctor if you are pregnant or plan to become pregnant. It is not known if ferric carboxymaltose will harm an unborn baby, but this medicine may cause severe reactions in the mother that could affect the baby's heartbeat.  Having iron deficiency anemia during pregnancy may increase the risk of premature birth or low birth weight. The benefit of treating this condition with ferric carboxymaltose may outweigh any risks to the baby.  If you are breastfeeding, tell your doctor if you notice diarrhea or constipation in the nursing baby.  How is ferric carboxymaltose given?  Ferric carboxymaltose is injected into a vein by a healthcare provider.  Tell your medical caregivers if you feel any burning or pain when ferric carboxymaltose is injected.  You will be watched for at least 30 minutes to make sure you do not have an allergic reaction.  This medicine is usually given in 2 doses, 7 days  information about ferric carboxymaltose.  Remember, keep this and all other medicines out of the reach of children, never share your medicines with others, and use this medication only for the indication prescribed.   Every effort has been made to ensure that the information provided by [employer] is accurate, up-to-date, and complete, but no guarantee is made to that effect. Drug information contained herein may be time sensitive. [employer] information has been compiled for use by healthcare practitioners and consumers in the [place] and therefore [employer] does not warrant that uses outside of the [place] are appropriate, unless specifically indicated otherwise. [employer]'s drug information does not endorse drugs, diagnose patients or recommend therapy. [employer] drug information is an informational resource designed to assist licensed healthcare practitioners in caring for their patients and/or to serve consumers viewing this service as a supplement to, and not a substitute for, the expertise, skill, knowledge and judgment of healthcare practitioners. The absence of a warning for a given drug or drug combination in no way should be construed to indicate that the drug or drug combination is safe, effective or appropriate for any given patient. [employer] does not assume any responsibility for any aspect of healthcare administered with the aid of information [employer] provides. The information contained herein is not intended to cover all possible uses, directions, precautions, warnings, drug interactions, allergic reactions, or adverse effects. If you have questions about the drugs you are taking, check with your doctor, nurse or pharmacist.  Copyright 9630-6759 [employer] Version: 3.01. Revision date: 9/24/2021.  Care instructions adapted under license by [employer]. If you have questions about a medical condition or this instruction, always ask your healthcare professional. [employer],

## 2025-06-05 ENCOUNTER — HOSPITAL ENCOUNTER (OUTPATIENT)
Facility: HOSPITAL | Age: 85
Setting detail: INFUSION SERIES
Discharge: HOME | End: 2025-06-05
Payer: MEDICARE

## 2025-06-05 VITALS
BODY MASS INDEX: 32.94 KG/M2 | TEMPERATURE: 98.1 F | SYSTOLIC BLOOD PRESSURE: 117 MMHG | HEART RATE: 70 BPM | HEIGHT: 62 IN | OXYGEN SATURATION: 98 % | RESPIRATION RATE: 18 BRPM | WEIGHT: 179 LBS | DIASTOLIC BLOOD PRESSURE: 73 MMHG

## 2025-06-05 DIAGNOSIS — D50.9 IRON DEFICIENCY ANEMIA, UNSPECIFIED IRON DEFICIENCY ANEMIA TYPE: Primary | ICD-10-CM

## 2025-06-05 PROCEDURE — 2580000003 HC RX 258

## 2025-06-05 PROCEDURE — 6360000002 HC RX W HCPCS: Performed by: INTERNAL MEDICINE

## 2025-06-05 PROCEDURE — 96374 THER/PROPH/DIAG INJ IV PUSH: CPT

## 2025-06-05 RX ORDER — SODIUM CHLORIDE 9 MG/ML
INJECTION, SOLUTION INTRAVENOUS CONTINUOUS
OUTPATIENT
Start: 2025-06-05

## 2025-06-05 RX ORDER — EPINEPHRINE 1 MG/ML
0.3 INJECTION, SOLUTION, CONCENTRATE INTRAVENOUS PRN
Status: DISCONTINUED | OUTPATIENT
Start: 2025-06-05 | End: 2025-06-06 | Stop reason: HOSPADM

## 2025-06-05 RX ORDER — ACETAMINOPHEN 325 MG/1
650 TABLET ORAL
Status: DISCONTINUED | OUTPATIENT
Start: 2025-06-05 | End: 2025-06-06 | Stop reason: HOSPADM

## 2025-06-05 RX ORDER — HYDROCORTISONE SODIUM SUCCINATE 100 MG/2ML
100 INJECTION INTRAMUSCULAR; INTRAVENOUS
OUTPATIENT
Start: 2025-06-05

## 2025-06-05 RX ORDER — SODIUM CHLORIDE 9 MG/ML
INJECTION, SOLUTION INTRAMUSCULAR; INTRAVENOUS; SUBCUTANEOUS
Status: COMPLETED
Start: 2025-06-05 | End: 2025-06-05

## 2025-06-05 RX ORDER — SODIUM CHLORIDE 9 MG/ML
5-250 INJECTION, SOLUTION INTRAVENOUS PRN
Status: DISCONTINUED | OUTPATIENT
Start: 2025-06-05 | End: 2025-06-06 | Stop reason: HOSPADM

## 2025-06-05 RX ORDER — EPINEPHRINE 1 MG/ML
0.3 INJECTION, SOLUTION, CONCENTRATE INTRAVENOUS PRN
OUTPATIENT
Start: 2025-06-05

## 2025-06-05 RX ORDER — SODIUM CHLORIDE 0.9 % (FLUSH) 0.9 %
5-40 SYRINGE (ML) INJECTION PRN
Status: DISCONTINUED | OUTPATIENT
Start: 2025-06-05 | End: 2025-06-06 | Stop reason: HOSPADM

## 2025-06-05 RX ORDER — DIPHENHYDRAMINE HYDROCHLORIDE 50 MG/ML
50 INJECTION, SOLUTION INTRAMUSCULAR; INTRAVENOUS
Status: DISCONTINUED | OUTPATIENT
Start: 2025-06-05 | End: 2025-06-06 | Stop reason: HOSPADM

## 2025-06-05 RX ORDER — ONDANSETRON 2 MG/ML
8 INJECTION INTRAMUSCULAR; INTRAVENOUS
Status: DISCONTINUED | OUTPATIENT
Start: 2025-06-05 | End: 2025-06-06 | Stop reason: HOSPADM

## 2025-06-05 RX ORDER — SODIUM CHLORIDE 9 MG/ML
5-250 INJECTION, SOLUTION INTRAVENOUS PRN
OUTPATIENT
Start: 2025-06-05

## 2025-06-05 RX ORDER — DIPHENHYDRAMINE HYDROCHLORIDE 50 MG/ML
50 INJECTION, SOLUTION INTRAMUSCULAR; INTRAVENOUS
OUTPATIENT
Start: 2025-06-05

## 2025-06-05 RX ORDER — HEPARIN 100 UNIT/ML
500 SYRINGE INTRAVENOUS PRN
OUTPATIENT
Start: 2025-06-05

## 2025-06-05 RX ORDER — SODIUM CHLORIDE 9 MG/ML
INJECTION, SOLUTION INTRAVENOUS CONTINUOUS
Status: DISCONTINUED | OUTPATIENT
Start: 2025-06-05 | End: 2025-06-06 | Stop reason: HOSPADM

## 2025-06-05 RX ORDER — ONDANSETRON 2 MG/ML
8 INJECTION INTRAMUSCULAR; INTRAVENOUS
OUTPATIENT
Start: 2025-06-05

## 2025-06-05 RX ORDER — HYDROCORTISONE SODIUM SUCCINATE 100 MG/2ML
100 INJECTION INTRAMUSCULAR; INTRAVENOUS
Status: DISCONTINUED | OUTPATIENT
Start: 2025-06-05 | End: 2025-06-06 | Stop reason: HOSPADM

## 2025-06-05 RX ORDER — ACETAMINOPHEN 325 MG/1
650 TABLET ORAL
OUTPATIENT
Start: 2025-06-05

## 2025-06-05 RX ORDER — ALBUTEROL SULFATE 90 UG/1
4 INHALANT RESPIRATORY (INHALATION) PRN
OUTPATIENT
Start: 2025-06-05

## 2025-06-05 RX ORDER — SODIUM CHLORIDE 0.9 % (FLUSH) 0.9 %
5-40 SYRINGE (ML) INJECTION PRN
OUTPATIENT
Start: 2025-06-05

## 2025-06-05 RX ORDER — SODIUM CHLORIDE 9 MG/ML
5-250 INJECTION, SOLUTION INTRAVENOUS PRN
Status: CANCELLED | OUTPATIENT
Start: 2025-06-05

## 2025-06-05 RX ORDER — ALBUTEROL SULFATE 90 UG/1
4 INHALANT RESPIRATORY (INHALATION) PRN
Status: DISCONTINUED | OUTPATIENT
Start: 2025-06-05 | End: 2025-06-06 | Stop reason: HOSPADM

## 2025-06-05 RX ADMIN — Medication 20 ML: at 12:03

## 2025-06-05 RX ADMIN — SODIUM CHLORIDE 20 ML: 9 INJECTION INTRAMUSCULAR; INTRAVENOUS; SUBCUTANEOUS at 12:03

## 2025-06-05 RX ADMIN — FERRIC CARBOXYMALTOSE INJECTION 750 MG: 50 INJECTION, SOLUTION INTRAVENOUS at 11:53

## 2025-06-05 ASSESSMENT — PAIN DESCRIPTION - LOCATION: LOCATION: BACK

## 2025-06-05 ASSESSMENT — PAIN SCALES - GENERAL: PAINLEVEL_OUTOF10: 7

## 2025-06-05 ASSESSMENT — PAIN DESCRIPTION - DESCRIPTORS: DESCRIPTORS: SHARP

## 2025-06-05 ASSESSMENT — PAIN DESCRIPTION - PAIN TYPE: TYPE: ACUTE PAIN

## 2025-06-05 ASSESSMENT — PAIN DESCRIPTION - FREQUENCY: FREQUENCY: INTERMITTENT

## 2025-06-05 ASSESSMENT — PAIN DESCRIPTION - DIRECTION: RADIATING_TOWARDS: L LEG

## 2025-06-05 NOTE — PLAN OF CARE
Problem: Pain  Goal: Verbalizes/displays adequate comfort level or baseline comfort level  Outcome: Adequate for Discharge  Flowsheets (Taken 6/5/2025 1211)  Verbalizes/displays adequate comfort level or baseline comfort level:   Encourage patient to monitor pain and request assistance   Administer analgesics based on type and severity of pain and evaluate response   Consider cultural and social influences on pain and pain management   Assess pain using appropriate pain scale   Implement non-pharmacological measures as appropriate and evaluate response   Notify Licensed Independent Practitioner if interventions unsuccessful or patient reports new pain     Problem: Safety - Adult  Goal: Free from fall injury  Outcome: Adequate for Discharge  Flowsheets (Taken 6/5/2025 1211)  Free From Fall Injury:   Instruct family/caregiver on patient safety   Based on caregiver fall risk screen, instruct family/caregiver to ask for assistance with transferring infant if caregiver noted to have fall risk factors

## 2025-06-05 NOTE — PROGRESS NOTES
[facility] Progress Note    Date: 2025    Name: Queta Quintanilla    MRN: [id number]         : 1940    Ms. Quintanilla was assessed and education was provided.     Ms. Quintanilla's vitals were reviewed.  Vitals:    25 1115   BP: 130/68   Pulse: 82   Resp: 20   Temp: 98.2 °F (36.8 °C)   SpO2: 97%         #24 established in the RFA    1153  Injectafer 750mg administered slow IVP followed by NS 20ml flush.    Observed pt for 30 min after administration. Pt denies complaints.  VSS.  IV discontinued, 2x2 and band-aid to site.    Armband was removed and shredded.    Ms. Quintanilla tolerated infusion, and had no complaints at this time.      [facility] 1240.   Pt is to return 25 at 1130 for next appt.   Tiffani Zuniga RN  2025  12:16 PM     negative...